# Patient Record
Sex: FEMALE | Race: BLACK OR AFRICAN AMERICAN | Employment: OTHER | URBAN - METROPOLITAN AREA
[De-identification: names, ages, dates, MRNs, and addresses within clinical notes are randomized per-mention and may not be internally consistent; named-entity substitution may affect disease eponyms.]

---

## 2019-01-23 ENCOUNTER — OFFICE VISIT (OUTPATIENT)
Dept: NEUROLOGY | Age: 84
End: 2019-01-23

## 2019-01-23 VITALS
HEART RATE: 84 BPM | BODY MASS INDEX: 23.92 KG/M2 | TEMPERATURE: 98 F | HEIGHT: 63 IN | DIASTOLIC BLOOD PRESSURE: 70 MMHG | WEIGHT: 135 LBS | RESPIRATION RATE: 16 BRPM | OXYGEN SATURATION: 98 % | SYSTOLIC BLOOD PRESSURE: 148 MMHG

## 2019-01-23 DIAGNOSIS — G40.209 DEJA VU SEIZURE DISORDER (HCC): Primary | ICD-10-CM

## 2019-01-23 RX ORDER — FLUOXETINE 20 MG/1
20 TABLET ORAL DAILY
COMMUNITY

## 2019-01-23 RX ORDER — HYDROCHLOROTHIAZIDE 12.5 MG/1
12.5 TABLET ORAL AS NEEDED
COMMUNITY

## 2019-01-23 RX ORDER — VALSARTAN 80 MG/1
TABLET ORAL DAILY
COMMUNITY

## 2019-01-23 RX ORDER — LEVOTHYROXINE SODIUM 25 UG/1
TABLET ORAL
COMMUNITY

## 2019-01-23 RX ORDER — CHROMIUM PICOLINATE 200 MCG
2 TABLET ORAL DAILY
COMMUNITY

## 2019-01-23 NOTE — PROGRESS NOTES
Patient identified with 2 ID's, Name and  Ann Marie Lara is a 81 y.o. female Chief Complaint Patient presents with  New Patient 1. Have you been to the ER, urgent care clinic since your last visit? Hospitalized since your last visit? No  
 
2. Have you seen or consulted any other health care providers outside of the [address] [place] [address] since your last visit? Include any pap smears or colon screening. Yes, PCP, Dr Charlie Friedman about 2 weeks ago for follow up appointment Visit Vitals /70 (BP 1 Location: Left arm, BP Patient Position: Sitting) Pulse 84 Temp 98 °F (36.7 °C) (Oral) Resp 16 Ht 5' 3\" (1.6 m) Wt 61.2 kg (135 lb) SpO2 98% BMI 23.91 kg/m² Medication Reconciliation reviewed with patient on this date Fall Risk Assessment, last 12 mths 2019 Able to walk? Yes Fall in past 12 months? No  
 
 
PHQ over the last two weeks 2019 Little interest or pleasure in doing things Not at all Feeling down, depressed, irritable, or hopeless More than half the days Total Score PHQ 2 2 Learning Assessment 2019 PRIMARY LEARNER Patient HIGHEST LEVEL OF EDUCATION - PRIMARY LEARNER  [address] [place] [address] Main PRIMARY LANGUAGE ENGLISH  
LEARNER PREFERENCE PRIMARY READING  
ANSWERED BY patient RELATIONSHIP SELF Abuse Screening Questionnaire 2019 Do you ever feel afraid of your partner? James Chacon Are you in a relationship with someone who physically or mentally threatens you? James Chacon Is it safe for you to go home?  Rogelio Waite
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
## 2019-01-23 NOTE — PATIENT INSTRUCTIONS
Patient history reviewed and patient examined. Will review some blood work check an EEG and get a brain MRI done on the basis of today's first encounter symptoms.

## 2019-01-24 ENCOUNTER — OFFICE VISIT (OUTPATIENT)
Dept: NEUROLOGY | Age: 84
End: 2019-01-24

## 2019-01-24 ENCOUNTER — HOSPITAL ENCOUNTER (OUTPATIENT)
Dept: MRI IMAGING | Age: 84
Discharge: HOME | End: 2019-01-24
Attending: SPECIALIST
Payer: MEDICARE

## 2019-01-24 DIAGNOSIS — G40.209 DEJA VU SEIZURE DISORDER (HCC): ICD-10-CM

## 2019-01-24 PROCEDURE — 70551 MRI BRAIN STEM W/O DYE: CPT

## 2019-01-25 NOTE — PROGRESS NOTES
This was an elective routine EEG for evaluation of what patient describes as a recurring déjà vu experience. Routine scalp leads were applied according to the 10-20 International system. EKG monitor as well. The background rhythm was 8 Hz and modulated well with eye opening and closure. No evidence of focal slowing or spontaneous electrocerebral discharges. EKG grossly sinus rhythm. Hyperventilation not performed. Photic stimulation produced a well-defined photic drive at different harmonics. No technician note as to any untoward patient movement mannerisms vocalizations or behavior. Impression: This is a normal awake routine EEG as described. Clinical correlation is advised.   LAYO ARANGO.

## 2019-01-30 ENCOUNTER — TELEPHONE (OUTPATIENT)
Dept: NEUROLOGY | Age: 84
End: 2019-01-30

## 2019-01-30 NOTE — TELEPHONE ENCOUNTER
----- Message from Harman Crane sent at 1/30/2019 12:05 PM EST -----  Regarding: bettie Barens , Would like to know the results of the labs that were recently taken.  Callback: [phone]

## 2019-01-30 NOTE — TELEPHONE ENCOUNTER
I tried to schedule the patient an appt. For a 6 week follow up like the office note from 1/24/19 stated. I informed her that they don't go over results over the phone. sonya is requesting the results over the phone before she schedules an appt.

## 2019-01-30 NOTE — TELEPHONE ENCOUNTER
----- Message from Carmen Hein sent at 1/30/2019  2:33 PM EST -----  Regarding: /Telephone  Amy Escalera called requesting a call back from a miss call from [facility]. Best contact number is [phone] .

## 2019-01-30 NOTE — TELEPHONE ENCOUNTER
----- Message from Radha Botello sent at 1/30/2019  2:53 PM EST -----  Regarding: Dr Foster/telephone   Pt (p) [phone],TAMARA was returning the nurses call. , said she just missed the call.

## 2019-02-22 ENCOUNTER — OFFICE VISIT (OUTPATIENT)
Dept: NEUROLOGY | Age: 84
End: 2019-02-22

## 2019-02-22 VITALS
SYSTOLIC BLOOD PRESSURE: 144 MMHG | WEIGHT: 131.2 LBS | HEART RATE: 72 BPM | DIASTOLIC BLOOD PRESSURE: 74 MMHG | OXYGEN SATURATION: 95 % | RESPIRATION RATE: 16 BRPM | BODY MASS INDEX: 23.25 KG/M2 | HEIGHT: 63 IN

## 2019-02-22 DIAGNOSIS — G40.209 DEJA VU SEIZURE DISORDER (HCC): Primary | ICD-10-CM

## 2019-02-22 NOTE — PROGRESS NOTES
Neurology Consult      Subjective:      Zhang Resendiz is a 81 y.o. female Who comes in today and went over test results. Brain MRI was normal as was EEG. Does not recall if she had an actual do redmond experience with EEG hookup as she was trying to be as settled and calm as possible. The episodes continue and no new changes otherwise dimension. In the context of this occurring after the death of a loved one and part of the grieving process, I thought I could offer her neuropsych testing. At this time patient wants to think about this and could let me know. We can make further deliberations depending on what the decision goes to at this point. I could tell that pressuring for an answer today on this testing would be a mistake and respecting her caution will pend any action at this point. Current Outpatient Medications   Medication Sig Dispense Refill    valsartan (DIOVAN) 80 mg tablet Take  by mouth daily.  levothyroxine (SYNTHROID) 25 mcg tablet Take  by mouth Daily (before breakfast).  hydroCHLOROthiazide (HYDRODIURIL) 12.5 mg tablet Take 12.5 mg by mouth as needed.  FLUoxetine (PROZAC) 20 mg tablet Take 20 mg by mouth daily.  Calcium-Cholecalciferol, D3, (CALCIUM 600 WITH VITAMIN D3) 600 mg(1,500mg) -400 unit cap Take 2 Caps by mouth daily.         Allergies   Allergen Reactions    Iodinated Contrast- Oral And Iv Dye Anaphylaxis     Throat tightened     Past Medical History:   Diagnosis Date    Anxiety     Depression     Hypertension     Thyroid disease       Past Surgical History:   Procedure Laterality Date    BREAST SURGERY PROCEDURE UNLISTED Right     biopsy      Social History     Socioeconomic History    Marital status:      Spouse name: Not on file    Number of children: Not on file    Years of education: Not on file    Highest education level: Not on file   Social Needs    Financial resource strain: Not on file    Food insecurity - worry: Not on file   Jenny Food insecurity - inability: Not on file    Transportation needs - medical: Not on file   [employer] needs - non-medical: Not on file   Occupational History    Not on file   Tobacco Use    Smoking status: Never Smoker    Smokeless tobacco: Never Used   Substance and Sexual Activity    Alcohol use: Yes     Alcohol/week: 1.8 oz     Types: 3 Glasses of wine per week    Drug use: Not on file    Sexual activity: Not on file   Other Topics Concern    Not on file   Social History Narrative    Not on file      Family History   Problem Relation Age of Onset    Cancer Mother     Cancer Sister     Cancer Brother       Visit Vitals  /74   Pulse 72   Resp 16   Ht 5' 3\" (1.6 m)   Wt 59.5 kg (131 lb 3.2 oz)   SpO2 95%   BMI 23.24 kg/m²        Review of Systems:   A comprehensive review of systems was negative except for that written in the HPI. Neuro Exam:     Appearance: The patient is well developed, well nourished, provides a coherent history and is in no acute distress. Mental Status: Oriented to time, place and person. Mood and affect appropriate. Cranial Nerves:   Intact visual fields. Fundi are benign. DEMETRI, EOM's full, no nystagmus, no ptosis. Facial sensation is normal. Corneal reflexes are intact. Facial movement is symmetric. Hearing is normal bilaterally. Palate is midline with normal sternocleidomastoid and trapezius muscles are normal. Tongue is midline. Motor:  5/5 strength in upper and lower proximal and distal muscles. Normal bulk and tone. No fasciculations. Reflexes:   Deep tendon reflexes 2+/4 and symmetrical.   Sensory:   Normal to touch, pinprick and vibration. Gait:  Normal gait. Tremor:   No tremor noted. Cerebellar:  No cerebellar signs present. Neurovascular:  Normal heart sounds and regular rhythm, peripheral pulses intact, and no carotid bruits. Assessment:   History of déjà vu experience.   Went over normal test results and could offer neuropsych testing especially in the context of the nature of the complaint and the fact that started as part of the grieving process with the loss of a loved one. Patient and family want to think about this and let me know and we will not go anywhere with this suggestion until I hear from them. Plan:   Revisit as needed.   Signed by :  Massiel Noguera MD

## 2019-02-22 NOTE — PATIENT INSTRUCTIONS
[facility]   Statement to Patients  April 1, 2014      In an effort to ensure the large volume of patient prescription refills is processed in the most efficient and expeditious manner, we are asking our patients to assist us by calling your Pharmacy for all prescription refills, this will include also your  Mail Order Pharmacy. The pharmacy will contact our office electronically to continue the refill process. Please do not wait until the last minute to call your pharmacy. We need at least 48 hours (2days) to fill prescriptions. We also encourage you to call your pharmacy before going to  your prescription to make sure it is ready. With regard to controlled substance prescription refill requests (narcotic refills) that need to be picked up at our office, we ask your cooperation by providing us with at least 72 hours (3days) notice that you will need a refill. We will not refill narcotic prescription refill requests after 4:00pm on any weekday, Monday through Thursday, or after 2:00pm on Fridays, or on the weekends. We encourage everyone to explore another way of getting your prescription refill request processed using [employer] our patient web portal through our electronic medical record system. [employer] is an efficient and effective way to communicate your medication request directly to the office and  downloadable as an daniel on your smart phone . [employer] also features a review functionality that allows you to view your medication list as well as leave messages for your physician. Are you ready to get connected? If so please review the attatched instructions or speak to any of our staff to get you set up right away! Thank you so much for your cooperation. Should you have any questions please contact our Practice Administrator.     The Physicians and Staff,  [facility]  What is a living will?    A living will is a legal form you use to write down the kind of care you want at the end of your life. It is used by the health professionals who will treat you if you aren't able to decide for yourself. If you put your wishes in writing, your loved ones and others will know what kind of care you want. They won't need to guess. This can ease your mind and be helpful to others. A living will is not the same as an estate or property will. An estate will explains what you want to happen with your money and property after you die. Is a living will a legal document? A living will is a legal document. Each state has its own laws about living friend. If you move to another state, make sure that your living will is legal in the state where you now live. Or you might use a universal form that has been approved by many states. This kind of form can sometimes be completed and stored online. Your electronic copy will then be available wherever you have a connection to the Internet. In most cases, doctors will respect your wishes even if you have a form from a different state. · You don't need an  to complete a living will. But legal advice can be helpful if your state's laws are unclear, your health history is complicated, or your family can't agree on what should be in your living will. · You can change your living will at any time. Some people find that their wishes about end-of-life care change as their health changes. · In addition to making a living will, think about completing a medical power of  form. This form lets you name the person you want to make end-of-life treatment decisions for you (your \"health care agent\") if you're not able to. Many hospitals and nursing homes will give you the forms you need to complete a living will and a medical power of . · Your living will is used only if you can't make or communicate decisions for yourself anymore.  If you become able to make decisions again, you can accept or refuse any treatment, no matter what you wrote in your living will. · Your state may offer an online registry. This is a place where you can store your living will online so the doctors and nurses who need to treat you can find it right away. What should you think about when creating a living will? Talk about your end-of-life wishes with your family members and your doctor. Let them know what you want. That way the people making decisions for you won't be surprised by your choices. Think about these questions as you make your living will:  · Do you know enough about life support methods that might be used? If not, talk to your doctor so you know what might be done if you can't breathe on your own, your heart stops, or you're unable to swallow. · What things would you still want to be able to do after you receive life-support methods? Would you want to be able to walk? To speak? To eat on your own? To live without the help of machines? · If you have a choice, where do you want to be cared for? In your home? At a hospital or nursing home? · Do you want certain Yazdanism practices performed if you become very ill? · If you have a choice at the end of your life, where would you prefer to die? At home? In a hospital or nursing home? Somewhere else? · Would you prefer to be buried or cremated? · Do you want your organs to be donated after you die? What should you do with your living will? · Make sure that your family members and your health care agent have copies of your living will. · Give your doctor a copy of your living will to keep in your medical record. If you have more than one doctor, make sure that each one has a copy. · You may want to put a copy of your living will where it can be easily found. Where can you learn more? Go to http://ethan-joseluis.info/. Enter S561 in the search box to learn more about \"Learning About Living Perdenis. \"  Current as of: April 18, 2018  Content Version: 11.9  © 2084-6839 [employer]. Care instructions adapted under license by [employer] (which disclaims liability or warranty for this information). If you have questions about a medical condition or this instruction, always ask your healthcare professional. [address] any warranty or liability for your use of this information. Went over test results with patient and family and next step could be neuropsych testing if every body is okay with that. Will let all parties think about this and if interested can call me back.

## 2019-09-18 ENCOUNTER — OFFICE VISIT (OUTPATIENT)
Dept: NEUROLOGY | Age: 84
End: 2019-09-18

## 2019-09-18 DIAGNOSIS — G40.209 DEJA VU SEIZURE DISORDER (HCC): ICD-10-CM

## 2019-09-18 DIAGNOSIS — F41.9 ANXIETY AND DEPRESSION: ICD-10-CM

## 2019-09-18 DIAGNOSIS — F32.A ANXIETY AND DEPRESSION: ICD-10-CM

## 2019-09-18 DIAGNOSIS — G31.84 MILD COGNITIVE IMPAIRMENT: Primary | ICD-10-CM

## 2019-09-18 NOTE — PROGRESS NOTES
[address] [facility],[address]  [address]   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 Please refer to her medical records for details pertaining to her history. Briefly, the patient reported that she completed high school and nursing school without history of previously diagnosed LD and/or receipt of special education services. She is a retired nurse. The patient has noticed a progressive decline in short term memory going on about a year and worsening. Forgets the content of conversations. Misplaces things. Sister notices that the patient is increasingly forgetful. She will ask the same questions over and over within a few minutes or so. Memory problems going on the past few years and worse in the last year  No known family history of dementia. She has jane vu experiences and has never been diagnosed with seizures. She doesn't drive anymore because she doesn't like to drive. She lives with her sister. Before she moved to [place] (she has been here a year), when she went out she would have to think about whether she had to turn right or left and thus decided to stop  Her sense of taste and smell have declined. She gets dizzy sometimes. She remains fairly independent for medications and finances and independent for her ADLs. She has fallen and has balance issues. She also gets anxious and she knows she is depressed. She lost her spouse in 2010. She is on medication for depression right now.         Neuropsychological Mental Status Exam (NMSE):  Historian: Good  Praxis: No UE apraxia  R/L Orientation: Intact to self and to other  Dress: within normal limits   Weight: within normal limits   Appearance/Hygiene: within normal limits   Gait: within normal limits   Assistive Devices: Glasses  Mood: within normal limits   Affect: within normal limits   Comprehension: within normal limits   Thought Process: within normal limits   Expressive Language: within normal limits   Receptive Language: within normal limits   Motor:  No cognitive or motor perseveration  ETOH: Occasionally wine, not every day  Tobacco: Denied  Illicit: Denied  SI/HI: Denied  Psychosis: Denied  Insight: Within normal limits  Judgment: Within normal limits  Other Psych:      Past Medical History:   Diagnosis Date    Anxiety     Depression     Hypertension     Thyroid disease        Past Surgical History:   Procedure Laterality Date    BREAST SURGERY PROCEDURE UNLISTED Right     biopsy       Allergies   Allergen Reactions    Iodinated Contrast Media Anaphylaxis     Throat tightened       Family History   Problem Relation Age of Onset    Cancer Mother     Cancer Sister     Cancer Brother        Social History     Tobacco Use    Smoking status: Never Smoker    Smokeless tobacco: Never Used   Substance Use Topics    Alcohol use: Yes     Alcohol/week: 3.0 standard drinks     Types: 3 Glasses of wine per week    Drug use: Not on file       Current Outpatient Medications   Medication Sig Dispense Refill    valsartan (DIOVAN) 80 mg tablet Take  by mouth daily.  levothyroxine (SYNTHROID) 25 mcg tablet Take  by mouth Daily (before breakfast).  hydroCHLOROthiazide (HYDRODIURIL) 12.5 mg tablet Take 12.5 mg by mouth as needed.  FLUoxetine (PROZAC) 20 mg tablet Take 20 mg by mouth daily.  Calcium-Cholecalciferol, D3, (CALCIUM 600 WITH VITAMIN D3) 600 mg(1,500mg) -400 unit cap Take 2 Caps by mouth daily. Plan:  Obtain authorization for testing from insurance company. Report to follow once testing, scoring, and interpretation completed.   ? Organic based neurocognitive issues versus mood disorder or combination of same. ? Problems organic, functional, or both? This note will not be viewable in [address]

## 2019-10-03 ENCOUNTER — OFFICE VISIT (OUTPATIENT)
Dept: NEUROLOGY | Age: 84
End: 2019-10-03

## 2019-10-03 DIAGNOSIS — R41.840 ATTENTION DEFICIT: ICD-10-CM

## 2019-10-03 DIAGNOSIS — F41.9 MODERATE ANXIETY: ICD-10-CM

## 2019-10-03 DIAGNOSIS — F32.1 MODERATE MAJOR DEPRESSION (HCC): ICD-10-CM

## 2019-10-03 DIAGNOSIS — R41.89 PSEUDODEMENTIA: Primary | ICD-10-CM

## 2019-10-03 DIAGNOSIS — F43.10 PTSD (POST-TRAUMATIC STRESS DISORDER): ICD-10-CM

## 2019-10-07 NOTE — PROGRESS NOTES
[address] [facility],5Th [address]  [address]   [place] [address] [place] [address] [facility] [facility]    [phone] Office   [phone] Fax      Neuropsychological Evaluation Report  Referral:   Kanwalminglana Robbie is a 81 y.o. right handed    female who was accompanied by her niece and her sister to the initial clinical interview on 9/18/19. Please refer to her medical records for details pertaining to her history. Briefly, the patient reported that she completed high school and nursing school without history of previously diagnosed LD and/or receipt of special education services. She is a retired nurse. The patient has noticed a progressive decline in short term memory going on about a year and worsening. Forgets the content of conversations. Misplaces things. Sister notices that the patient is increasingly forgetful. She will ask the same questions over and over within a few minutes or so. Memory problems going on the past few years and worse in the last year  No known family history of dementia. She has jane vu experiences and has never been diagnosed with seizures. She doesn't drive anymore because she doesn't like to drive. She lives with her sister. Before she moved to [place] (she has been here a year), when she went out she would have to think about whether she had to turn right or left and thus decided to stop  Her sense of taste and smell have declined. She gets dizzy sometimes. She remains fairly independent for medications and finances and independent for her ADLs. She has fallen and has balance issues. She also gets anxious and she knows she is depressed. She lost her spouse in 2010. She is on medication for depression right now.         Neuropsychological Mental Status Exam (NMSE):  Historian: Good  Praxis: No UE apraxia  R/L Orientation: Intact to self and to other  Dress: within normal limits   Weight: within normal limits   Appearance/Hygiene: within normal limits   Gait: within normal limits   Assistive Devices: Glasses  Mood: within normal limits   Affect: within normal limits   Comprehension: within normal limits   Thought Process: within normal limits   Expressive Language: within normal limits   Receptive Language: within normal limits   Motor:  No cognitive or motor perseveration  ETOH: Occasionally wine, not every day  Tobacco: Denied  Illicit: Denied  SI/HI: Denied  Psychosis: Denied  Insight: Within normal limits  Judgment: Within normal limits  Other Psych:      Past Medical History:   Diagnosis Date    Anxiety     Depression     Hypertension     Thyroid disease        Past Surgical History:   Procedure Laterality Date    BREAST SURGERY PROCEDURE UNLISTED Right     biopsy       Allergies   Allergen Reactions    Iodinated Contrast Media Anaphylaxis     Throat tightened       Family History   Problem Relation Age of Onset    Cancer Mother     Cancer Sister     Cancer Brother        Social History     Tobacco Use    Smoking status: Never Smoker    Smokeless tobacco: Never Used   Substance Use Topics    Alcohol use: Yes     Alcohol/week: 3.0 standard drinks     Types: 3 Glasses of wine per week    Drug use: Not on file       Current Outpatient Medications   Medication Sig Dispense Refill    valsartan (DIOVAN) 80 mg tablet Take  by mouth daily.  levothyroxine (SYNTHROID) 25 mcg tablet Take  by mouth Daily (before breakfast).  hydroCHLOROthiazide (HYDRODIURIL) 12.5 mg tablet Take 12.5 mg by mouth as needed.  FLUoxetine (PROZAC) 20 mg tablet Take 20 mg by mouth daily.  Calcium-Cholecalciferol, D3, (CALCIUM 600 WITH VITAMIN D3) 600 mg(1,500mg) -400 unit cap Take 2 Caps by mouth daily. Plan:  Obtain authorization for testing from insurance company. Report to follow once testing, scoring, and interpretation completed.   ? Organic based neurocognitive issues versus mood disorder or combination of same. ? Problems organic, functional, or both? This note will not be viewable in [address] Neuropsychological Test Results  Patient Testing 10/3/19 Report Completed 10/7/19  A Psychometrist Assisted w/ portions of this evaluation while under my direct  supervision    The following evaluation procedures/tests were administered:      Neuropsychologist Performed, Interpreted, & Reported:  Neuropsychological Mental Status Exam, Revised Memory & Behavior Checklist,  Mini Mental Status Exam, Clock Drawing Test, Jeff-Melzack Pain Questionnaire, Test Of Premorbid Functioning, History Taking  & Clinical Interview With The Patient, Additional History Taking w/ the Patient's Family, , CASE, ABAS-3, PATRICE, CPT-III, Review Of Available Records. Psychometrist Administered under Neuropsychologist Supervision & Neuropsychologist Interpreted & Neuropsychologist Reported:  Verbal Fluency Tests, Tremayne & Tremayne - Revised, Trailmaking Test Parts A & B, Wechsler Adult Intelligence Scale - IV, [place] Verbal Learning Test - 3, Grooved Pegboard, Garay Depression Inventory - II, Garay Anxiety Inventory. Test Findings:  Test Findings:  Note:  The patients raw data have been compared with currently available norms which include demographic corrections for age, gender, and/or education. Sometimes, the patients scores are compared to demographically similar individuals as close to the patients age, education level, etc., as possible. \"Average\" is viewed as being +/- 1 standard deviation (SD) from the stated mean for a particular test score. \"Low average\" is viewed as being between 1 and 2 SD below the mean, and above average is viewed as being 1 and 2 SD above the mean. Scores falling in the borderline range (between 1-1/2 and 2 SD below the mean) are viewed with particular attention as to whether they are normal or abnormal neurocognitive test scores.   Other methods of inference in analyzing the test data are also utilized, including the pattern and range of scores in the profile, bilateral motor functions, and the presence, if any, of pathognomonic signs. Behaviorally, the patient was friendly and cooperative and appeared motivated to perform well during this examination. Within this context, the results of this evaluation are viewed as a valid reflection of the patients actual neurocognitive and emotional status. The patient's score of 27/30 on the Mini-Mental Status Exam was normal.  In this regard, she was not oriented to county. Recall for three words after a brief delay was 1/3 correct. Clock drawing was within normal limits. Her structured word list fluency, as assessed by the FAS Test, was within the average range with a T score of 48. Category fluency was within the average range with a T score of 53. Confrontation naming ability, as assessed by the Tremayne & Tremayne - Revised, was within the below average range at 44/60 correct (T = 44. This pattern of performance is not indicative of a patient who is at increased risk for day-to-day problems with verbal fluency or confrontation naming. The patient was administered the [facility] Continuous Performance Test - III and review of the subscales within this instrument revealed mild concerns for inattentiveness without impulsivity. This pattern of performance is indicative of a patient who is at increased risk for day-to-day problems with sustained visual attention/concentration. The patient is not showing problems with working memory capacity (23rd %ile) or processing speed (42nd %ile) on the WAIS-IV. Her Verbal Comprehension Index score of 95 was within the average range . Her Perceptual Reasoning Index score of 88 was low average . These scores do not reflect a major decline in functioning based on an assessment of premorbid functioning.         The patient was administered the [place] Verbal Learning Test  - 3 and generated a normal range and mostly positive learning curve over five repeated auditory word list learning trials. An interference trial was normal.   Free  Recall after a short and long delay were impaired. Cued recall after a short and long delay were normal.  Recognition recall was normal (91st %ile). Forced choice recall was normal.  This pattern of performance is not strongly indicative of a patient who is at increased risk for day-to-day problems with auditory learning and/or memory. Simple timed visual motor sequencing (Trailmaking Test Part A) was within the mildly impaired range with a T score of 39. Her performance on a similar, but more complex task of timed visual motor sequencing (Trailmaking Test Part B) was within the average range with a T score of 50 (1 error). This pattern of performance is not indicative of a patient who is at increased risk for day-to-day problems with executive functioning. Fine motor dexterity was within the normal range bilaterally. This does not raise concern for a particularly lateralized brain dysfunction. The patient rated her current level of pain as \"0/5- No Pain\" on the Jeff-Melzack Pain Questionnaire. She reported periodic pain in her left hip and left knee. Her Garay Depression Inventory -II score of 23 was within the moderately depressed range. Her Garay Anxiety Inventory score of 12 reflected mild anxiety. The patient's responses on the Personality Assessment Inventory were deemed valid for interpretation. Within this context, she reports concern for PTSD as well as depression and anxiety. Maladaptive behavior patterns aimed at controlling anxiety are present. Self-concept is fixed, harsh, and negative. She is motivated for treatment.        The sibling completed the ABAS-3 and did not report clinically significant concerns regarding the patient's general adaptive skills (42nd %ile), conceptual skills (39th %ile), social skills (27th %ile), or practical skills (63rd %ile). On the CASE, the sibgling did not report concerns regarding the patient's mood or cognitive functioning. Impressions & Recommendations: This is a predominantly normal range Neuropsychological Evaluation with respect to neurocognitive functioning. In this regard, she is showing mild difficulties with attention and mild problems with free recall. At the same time, her cued recall and recognition recall scores are normal.  Additionally, her performances across all other neurocognitive domains assessed are fully within or above the normal range. From an emotional standpoint, there is moderate depression, PTSD, and mild to moderate anxiety. No psychosis is seen here, but she does report jane vu type experiences occasionally. 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 There is no evidence which would suggest an organic based memory disorder here, and her recognition score is at the 91st %ile. This is very reassuring. Psychiatric treatment and psychotherapy for mood problems are both concurrently suggested. The attention deficit issue is mild, chronic, and probably not worth the risk of attempting to treat at this time, but neurologic correlation is indicated. I am not concerned about competency, day-to-day supervision, etc., from a neurocognitive standpoint, but her psychiatric distress does suggest that she will struggle functionally on a day-to-day basis, so I advise family support and/or in home nursing care as needed. Stay active mentally, physically, and socially. Baseline now established. Follow up prn. Clinical correlation is, of course, indicated. I will discuss these findings with the patient when she follows up with me in the near future.   A follow up Neuropsychological Evaluation is indicated on a prn basis, especially if there are any cognitive and/or emotional changes. The above information is based upon information currently available to me. If there is any additional information of which I am currently unaware, I would be more than happy to review it upon having it made available to me. Thank you for the opportunity to see this interesting individual.     Sincerely,       Jose Carlos Muñoz. Jasper Eisenmenger, EdS    CC:  Dr. Madelin Lee    Time Documentation:    33445 x 1: Neurobehavioral Status Exam/Clinical Interview: (1 hour, (already billed on first date of service)    57237 x 1: Neurobehavioral Status Exam, Additional: (35 additional minutes, see above)     96138 x 1  96139 x 5 Test Administration/Data Gathering By Technician: (3 hours). 83142 x 1 (first 30 minutes), 22010 x 5 (each additional 30 minutes)    96132 x 1  96133 x 1 Testing Evaluation Services by Neuropsychologist (1 hour, 50 minutes) 96132 x 1 (first hour), 96133 x 1 (50 minutes)    Definitions:      99068/22839:  Neurobehavioral Status Exam, Clinical interview. Clinical assessment of thinking, reasoning and judgment, by neuropsychologist, both face to face time with patient and time interpreting those test results and reporting, first and subsequent hours)    30625/30164: Neuropsychological Test Administration by Technician/Psychometrist, first 30 minutes and each additional 30 minutes. The above includes: Record review. Review of history provided by patient. Review of collaborative information. Testing by Clinician. Review of raw data. Scoring. Report writing of individual tests administered by Clinician. Integration of individual tests administered by psychometrist with NSE/testing by clinician, review of records/history/collaborative information, case Conceptualization, treatment planning, clinical decision making, report writing, coordination Of Care.  Psychometry test codes as time spent by psychometrist administering and scoring neurocognitive/psychological tests under supervision of neuropsychologist.  Integral services including scoring of raw data, data interpretation, case conceptualization, report writing etcetera were initiated after the patient finished testing/raw data collected and was completed on the date the report was signed.

## 2019-10-18 ENCOUNTER — OFFICE VISIT (OUTPATIENT)
Dept: NEUROLOGY | Age: 84
End: 2019-10-18

## 2019-10-18 VITALS
SYSTOLIC BLOOD PRESSURE: 144 MMHG | DIASTOLIC BLOOD PRESSURE: 72 MMHG | HEIGHT: 63 IN | BODY MASS INDEX: 23.58 KG/M2 | WEIGHT: 133.1 LBS | HEART RATE: 70 BPM | OXYGEN SATURATION: 100 % | RESPIRATION RATE: 16 BRPM

## 2019-10-18 DIAGNOSIS — R41.3 MEMORY CHANGES: Primary | ICD-10-CM

## 2019-10-18 NOTE — PROGRESS NOTES
Neurology Consult      Subjective:      Becca Ortega is a 81 y.o. female who comes in today for results of testing. As a recall the testing I ordered beyond memory assessment included lab work MRI and EEG. We turned up no significant abnormalities. 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 It was considered possibly a result of mild and chronic attention deficit. Possible suggestions to psychiatric eval and psychotherapy as attention issues were mild and otherwise not worth treating. Competency okay and family support is there and suggestion to stay busy. The family says categorically she is vastly improved since her first encounter with me. We will put in a suggestion to psych her psychotherapy on hold and I think the family's job is going to be to get her out of the house and socialize exercise and reinvent herself. Is about a year out from her move from [place] and is in a slight grieving mode still but working through it. I think this is an excellent game plan and if I do not hear from anyone I will assume her improvement and success continues. Been a pleasure working with her and good luck. Current Outpatient Medications   Medication Sig Dispense Refill    valsartan (DIOVAN) 80 mg tablet Take  by mouth daily.  levothyroxine (SYNTHROID) 25 mcg tablet Take  by mouth Daily (before breakfast).  hydroCHLOROthiazide (HYDRODIURIL) 12.5 mg tablet Take 12.5 mg by mouth as needed.  FLUoxetine (PROZAC) 20 mg tablet Take 20 mg by mouth daily.  Calcium-Cholecalciferol, D3, (CALCIUM 600 WITH VITAMIN D3) 600 mg(1,500mg) -400 unit cap Take 2 Caps by mouth daily.         Allergies   Allergen Reactions    Iodinated Contrast Media Anaphylaxis     Throat tightened     Past Medical History:   Diagnosis Date    Anxiety     Depression     Hypertension     Thyroid disease       Past Surgical History:   Procedure Laterality Date    BREAST SURGERY PROCEDURE UNLISTED Right     biopsy      Social History     Socioeconomic History    Marital status:      Spouse name: Not on file    Number of children: Not on file    Years of education: Not on file    Highest education level: Not on file   Occupational History    Not on file   Social Needs    Financial resource strain: Not on file    Food insecurity:     Worry: Not on file     Inability: Not on file    Transportation needs:     Medical: Not on file     Non-medical: Not on file   Tobacco Use    Smoking status: Never Smoker    Smokeless tobacco: Never Used   Substance and Sexual Activity    Alcohol use: Yes     Alcohol/week: 3.0 standard drinks     Types: 3 Glasses of wine per week    Drug use: Not on file    Sexual activity: Not on file   Lifestyle    Physical activity:     Days per week: Not on file     Minutes per session: Not on file    Stress: Not on file   Relationships    Social connections:     Talks on phone: Not on file     Gets together: Not on file     Attends Quaker service: Not on file     Active member of club or organization: Not on file     Attends meetings of clubs or organizations: Not on file     Relationship status: Not on file    Intimate partner violence:     Fear of current or ex partner: Not on file     Emotionally abused: Not on file     Physically abused: Not on file     Forced sexual activity: Not on file   Other Topics Concern    Not on file   Social History Narrative    Not on file      Family History   Problem Relation Age of Onset    Cancer Mother     Cancer Sister     Cancer Brother       Visit Vitals  /72   Pulse 70   Resp 16   Ht 5' 3\" (1.6 m)   Wt 60.4 kg (133 lb 1.6 oz)   SpO2 100%   BMI 23.58 kg/m²        Review of Systems:   A comprehensive review of systems was negative except for that written in the HPI. Neuro Exam:     Appearance:   The patient is well developed, well nourished, provides a coherent history and is in no acute distress. Mental Status: Oriented to time, place and person. Mood and affect appropriate. Cranial Nerves:   Intact visual fields. Fundi are benign. DEMETRI, EOM's full, no nystagmus, no ptosis. Facial sensation is normal. Corneal reflexes are intact. Facial movement is symmetric. Hearing is normal bilaterally. Palate is midline with normal sternocleidomastoid and trapezius muscles are normal. Tongue is midline. Motor:  5/5 strength in upper and lower proximal and distal muscles. Normal bulk and tone. No fasciculations. Reflexes:   Deep tendon reflexes 2+/4 and symmetrical.   Sensory:   Normal to touch, pinprick and vibration. Gait:  Normal gait. Tremor:   No tremor noted. Cerebellar:  No cerebellar signs present. Neurovascular:  Normal heart sounds and regular rhythm, peripheral pulses intact, and no carotid bruits. Assessment:   Pseudodementia. Please see above dictation. Family is quite convinced patient has improved significantly since her first encounter. They already know that she needs to get out of the house and get herself preoccupied and improve her socialization and interaction and exercise etc. I think that sounds like a weaning formula and we will see her back as needed. Is getting used to move from the [place] and is in a grieving mode as well. Hopefully this will all pass. Plan:   Revisit as needed.   Signed by :  Massiel Saldana MD

## 2019-10-18 NOTE — PATIENT INSTRUCTIONS
[facility]   Statement to Patients  April 1, 2014      In an effort to ensure the large volume of patient prescription refills is processed in the most efficient and expeditious manner, we are asking our patients to assist us by calling your Pharmacy for all prescription refills, this will include also your  Mail Order Pharmacy. The pharmacy will contact our office electronically to continue the refill process. Please do not wait until the last minute to call your pharmacy. We need at least 48 hours (2days) to fill prescriptions. We also encourage you to call your pharmacy before going to  your prescription to make sure it is ready. With regard to controlled substance prescription refill requests (narcotic refills) that need to be picked up at our office, we ask your cooperation by providing us with at least 72 hours (3days) notice that you will need a refill. We will not refill narcotic prescription refill requests after 4:00pm on any weekday, Monday through Thursday, or after 2:00pm on Fridays, or on the weekends. We encourage everyone to explore another way of getting your prescription refill request processed using [employer], our patient web portal through our electronic medical record system. [employer] is an efficient and effective way to communicate your medication request directly to the office and  downloadable as an daniel on your smart phone . [employer] also features a review functionality that allows you to view your medication list as well as leave messages for your physician. Are you ready to get connected? If so please review the attatched instructions or speak to any of our staff to get you set up right away! Thank you so much for your cooperation. Should you have any questions please contact our Practice Administrator.     The Physicians and Staff,  [facility] [address]  What is a living will?    A living will is a legal form you use to write down the kind of care you want at the end of your life. It is used by the health professionals who will treat you if you aren't able to decide for yourself. If you put your wishes in writing, your loved ones and others will know what kind of care you want. They won't need to guess. This can ease your mind and be helpful to others. A living will is not the same as an estate or property will. An estate will explains what you want to happen with your money and property after you die. Is a living will a legal document? A living will is a legal document. Each state has its own laws about living friend. If you move to another state, make sure that your living will is legal in the state where you now live. Or you might use a universal form that has been approved by many states. This kind of form can sometimes be completed and stored online. Your electronic copy will then be available wherever you have a connection to the Internet. In most cases, doctors will respect your wishes even if you have a form from a different state. · You don't need an  to complete a living will. But legal advice can be helpful if your state's laws are unclear, your health history is complicated, or your family can't agree on what should be in your living will. · You can change your living will at any time. Some people find that their wishes about end-of-life care change as their health changes. · In addition to making a living will, think about completing a medical power of  form. This form lets you name the person you want to make end-of-life treatment decisions for you (your \"health care agent\") if you're not able to. Many hospitals and nursing homes will give you the forms you need to complete a living will and a medical power of . · Your living will is used only if you can't make or communicate decisions for yourself anymore.  If you become able to make decisions again, you can accept or refuse any treatment, no matter what you wrote in your living will. · Your state may offer an online registry. This is a place where you can store your living will online so the doctors and nurses who need to treat you can find it right away. What should you think about when creating a living will? Talk about your end-of-life wishes with your family members and your doctor. Let them know what you want. That way the people making decisions for you won't be surprised by your choices. Think about these questions as you make your living will:  · Do you know enough about life support methods that might be used? If not, talk to your doctor so you know what might be done if you can't breathe on your own, your heart stops, or you're unable to swallow. · What things would you still want to be able to do after you receive life-support methods? Would you want to be able to walk? To speak? To eat on your own? To live without the help of machines? · If you have a choice, where do you want to be cared for? In your home? At a hospital or nursing home? · Do you want certain [employer] practices performed if you become very ill? · If you have a choice at the end of your life, where would you prefer to die? At home? In a hospital or nursing home? Somewhere else? · Would you prefer to be buried or cremated? · Do you want your organs to be donated after you die? What should you do with your living will? · Make sure that your family members and your health care agent have copies of your living will. · Give your doctor a copy of your living will to keep in your medical record. If you have more than one doctor, make sure that each one has a copy. · You may want to put a copy of your living will where it can be easily found. Where can you learn more? Go to http://ethan-joseluis.info/. Enter D887 in the search box to learn more about \"Learning About Living [place]. \"  Current as of: April 1, 2019  Content Version: 12.2  © 3709-9909 [employer]. Care instructions adapted under license by [employer] (which disclaims liability or warranty for this information). If you have questions about a medical condition or this instruction, always ask your healthcare professional. [address] any warranty or liability for your use of this information. Went over test results with patient and family says emphatically she has improved. At this point I think attention should be to increase socialization, exercise and staying both physically and mentally preoccupied. Is welcome back as needed. Good luck.